# Patient Record
Sex: MALE | Race: WHITE | NOT HISPANIC OR LATINO | URBAN - METROPOLITAN AREA
[De-identification: names, ages, dates, MRNs, and addresses within clinical notes are randomized per-mention and may not be internally consistent; named-entity substitution may affect disease eponyms.]

---

## 2018-12-14 ENCOUNTER — EMERGENCY (EMERGENCY)
Facility: HOSPITAL | Age: 58
LOS: 1 days | Discharge: ANOTHER HOSPITAL | End: 2018-12-14
Attending: EMERGENCY MEDICINE | Admitting: EMERGENCY MEDICINE
Payer: COMMERCIAL

## 2018-12-14 ENCOUNTER — EMERGENCY (EMERGENCY)
Facility: HOSPITAL | Age: 58
LOS: 1 days | Discharge: HOME | End: 2018-12-14
Attending: EMERGENCY MEDICINE | Admitting: EMERGENCY MEDICINE
Payer: COMMERCIAL

## 2018-12-14 VITALS
SYSTOLIC BLOOD PRESSURE: 147 MMHG | TEMPERATURE: 99 F | HEART RATE: 114 BPM | OXYGEN SATURATION: 99 % | DIASTOLIC BLOOD PRESSURE: 90 MMHG | RESPIRATION RATE: 21 BRPM

## 2018-12-14 VITALS
DIASTOLIC BLOOD PRESSURE: 100 MMHG | RESPIRATION RATE: 17 BRPM | OXYGEN SATURATION: 98 % | TEMPERATURE: 98 F | HEART RATE: 133 BPM | SYSTOLIC BLOOD PRESSURE: 160 MMHG

## 2018-12-14 VITALS
SYSTOLIC BLOOD PRESSURE: 142 MMHG | RESPIRATION RATE: 18 BRPM | DIASTOLIC BLOOD PRESSURE: 103 MMHG | WEIGHT: 164.91 LBS | TEMPERATURE: 98 F | HEIGHT: 70 IN | HEART RATE: 125 BPM | OXYGEN SATURATION: 98 %

## 2018-12-14 VITALS
OXYGEN SATURATION: 99 % | DIASTOLIC BLOOD PRESSURE: 110 MMHG | SYSTOLIC BLOOD PRESSURE: 152 MMHG | RESPIRATION RATE: 16 BRPM | HEART RATE: 102 BPM

## 2018-12-14 DIAGNOSIS — R04.0 EPISTAXIS: ICD-10-CM

## 2018-12-14 DIAGNOSIS — Z88.1 ALLERGY STATUS TO OTHER ANTIBIOTIC AGENTS STATUS: ICD-10-CM

## 2018-12-14 DIAGNOSIS — Z79.2 LONG TERM (CURRENT) USE OF ANTIBIOTICS: ICD-10-CM

## 2018-12-14 DIAGNOSIS — I10 ESSENTIAL (PRIMARY) HYPERTENSION: ICD-10-CM

## 2018-12-14 DIAGNOSIS — R45.0 NERVOUSNESS: ICD-10-CM

## 2018-12-14 LAB
ALBUMIN SERPL ELPH-MCNC: 4.3 G/DL — SIGNIFICANT CHANGE UP (ref 3.4–5)
ALP SERPL-CCNC: 52 U/L — SIGNIFICANT CHANGE UP (ref 40–120)
ALT FLD-CCNC: 56 U/L — HIGH (ref 12–42)
ANION GAP SERPL CALC-SCNC: 9 MMOL/L — SIGNIFICANT CHANGE UP (ref 9–16)
APTT BLD: 30.8 SEC — SIGNIFICANT CHANGE UP (ref 27.5–36.3)
AST SERPL-CCNC: 51 U/L — HIGH (ref 15–37)
BASOPHILS NFR BLD AUTO: 0.9 % — SIGNIFICANT CHANGE UP (ref 0–2)
BILIRUB SERPL-MCNC: 0.6 MG/DL — SIGNIFICANT CHANGE UP (ref 0.2–1.2)
BUN SERPL-MCNC: 10 MG/DL — SIGNIFICANT CHANGE UP (ref 7–23)
CALCIUM SERPL-MCNC: 9.4 MG/DL — SIGNIFICANT CHANGE UP (ref 8.5–10.5)
CHLORIDE SERPL-SCNC: 103 MMOL/L — SIGNIFICANT CHANGE UP (ref 96–108)
CO2 SERPL-SCNC: 28 MMOL/L — SIGNIFICANT CHANGE UP (ref 22–31)
CREAT SERPL-MCNC: 0.87 MG/DL — SIGNIFICANT CHANGE UP (ref 0.5–1.3)
EOSINOPHIL NFR BLD AUTO: 0.1 % — SIGNIFICANT CHANGE UP (ref 0–6)
GLUCOSE SERPL-MCNC: 120 MG/DL — HIGH (ref 70–99)
HCT VFR BLD CALC: 41.2 % — SIGNIFICANT CHANGE UP (ref 39–50)
HCT VFR BLD CALC: 45.1 % — SIGNIFICANT CHANGE UP (ref 39–50)
HGB BLD-MCNC: 13.9 G/DL — SIGNIFICANT CHANGE UP (ref 13–17)
HGB BLD-MCNC: 15.4 G/DL — SIGNIFICANT CHANGE UP (ref 13–17)
IMM GRANULOCYTES NFR BLD AUTO: 0.2 % — SIGNIFICANT CHANGE UP (ref 0–1.5)
INR BLD: 0.98 — SIGNIFICANT CHANGE UP (ref 0.88–1.16)
LYMPHOCYTES # BLD AUTO: 11 % — LOW (ref 13–44)
MCHC RBC-ENTMCNC: 33.3 PG — SIGNIFICANT CHANGE UP (ref 27–34)
MCHC RBC-ENTMCNC: 33.3 PG — SIGNIFICANT CHANGE UP (ref 27–34)
MCHC RBC-ENTMCNC: 33.7 G/DL — SIGNIFICANT CHANGE UP (ref 32–36)
MCHC RBC-ENTMCNC: 34.1 G/DL — SIGNIFICANT CHANGE UP (ref 32–36)
MCV RBC AUTO: 97.4 FL — SIGNIFICANT CHANGE UP (ref 80–100)
MCV RBC AUTO: 98.6 FL — SIGNIFICANT CHANGE UP (ref 80–100)
MONOCYTES NFR BLD AUTO: 9.2 % — SIGNIFICANT CHANGE UP (ref 2–14)
NEUTROPHILS NFR BLD AUTO: 78.6 % — HIGH (ref 43–77)
PLATELET # BLD AUTO: 221 K/UL — SIGNIFICANT CHANGE UP (ref 150–400)
PLATELET # BLD AUTO: 266 K/UL — SIGNIFICANT CHANGE UP (ref 150–400)
POTASSIUM SERPL-MCNC: 4.4 MMOL/L — SIGNIFICANT CHANGE UP (ref 3.5–5.3)
POTASSIUM SERPL-SCNC: 4.4 MMOL/L — SIGNIFICANT CHANGE UP (ref 3.5–5.3)
PROT SERPL-MCNC: 8.3 G/DL — HIGH (ref 6.4–8.2)
PROTHROM AB SERPL-ACNC: 10.9 SEC — SIGNIFICANT CHANGE UP (ref 10–12.9)
RBC # BLD: 4.18 M/UL — LOW (ref 4.2–5.8)
RBC # BLD: 4.63 M/UL — SIGNIFICANT CHANGE UP (ref 4.2–5.8)
RBC # FLD: 12.4 % — SIGNIFICANT CHANGE UP (ref 10.3–14.5)
RBC # FLD: 12.4 % — SIGNIFICANT CHANGE UP (ref 10.3–16.9)
SODIUM SERPL-SCNC: 140 MMOL/L — SIGNIFICANT CHANGE UP (ref 132–145)
WBC # BLD: 7 K/UL — SIGNIFICANT CHANGE UP (ref 3.8–10.5)
WBC # BLD: 9.2 K/UL — SIGNIFICANT CHANGE UP (ref 3.8–10.5)
WBC # FLD AUTO: 7 K/UL — SIGNIFICANT CHANGE UP (ref 3.8–10.5)
WBC # FLD AUTO: 9.2 K/UL — SIGNIFICANT CHANGE UP (ref 3.8–10.5)

## 2018-12-14 PROCEDURE — 99284 EMERGENCY DEPT VISIT MOD MDM: CPT | Mod: 25

## 2018-12-14 PROCEDURE — 85027 COMPLETE CBC AUTOMATED: CPT

## 2018-12-14 PROCEDURE — 30903 CONTROL OF NOSEBLEED: CPT | Mod: 50

## 2018-12-14 PROCEDURE — 99285 EMERGENCY DEPT VISIT HI MDM: CPT | Mod: 25

## 2018-12-14 PROCEDURE — 36415 COLL VENOUS BLD VENIPUNCTURE: CPT

## 2018-12-14 PROCEDURE — 93010 ELECTROCARDIOGRAM REPORT: CPT | Mod: 59

## 2018-12-14 PROCEDURE — 93005 ELECTROCARDIOGRAM TRACING: CPT

## 2018-12-14 RX ORDER — SODIUM CHLORIDE 9 MG/ML
1000 INJECTION INTRAMUSCULAR; INTRAVENOUS; SUBCUTANEOUS ONCE
Qty: 0 | Refills: 0 | Status: COMPLETED | OUTPATIENT
Start: 2018-12-14 | End: 2018-12-14

## 2018-12-14 RX ORDER — LIDOCAINE 4 G/100G
1 CREAM TOPICAL ONCE
Qty: 0 | Refills: 0 | Status: COMPLETED | OUTPATIENT
Start: 2018-12-14 | End: 2018-12-14

## 2018-12-14 RX ORDER — OXYMETAZOLINE HYDROCHLORIDE 0.5 MG/ML
1 SPRAY NASAL ONCE
Qty: 0 | Refills: 0 | Status: COMPLETED | OUTPATIENT
Start: 2018-12-14 | End: 2018-12-14

## 2018-12-14 RX ORDER — SODIUM CHLORIDE 0.65 %
2 AEROSOL, SPRAY (ML) NASAL ONCE
Qty: 0 | Refills: 0 | Status: DISCONTINUED | OUTPATIENT
Start: 2018-12-14 | End: 2018-12-18

## 2018-12-14 RX ORDER — OXYMETAZOLINE HYDROCHLORIDE 0.5 MG/ML
2 SPRAY NASAL ONCE
Qty: 0 | Refills: 0 | Status: COMPLETED | OUTPATIENT
Start: 2018-12-14 | End: 2018-12-14

## 2018-12-14 RX ADMIN — Medication 1 APPLICATION(S): at 12:30

## 2018-12-14 RX ADMIN — OXYMETAZOLINE HYDROCHLORIDE 2 SPRAY(S): 0.5 SPRAY NASAL at 19:47

## 2018-12-14 RX ADMIN — SODIUM CHLORIDE 1000 MILLILITER(S): 9 INJECTION INTRAMUSCULAR; INTRAVENOUS; SUBCUTANEOUS at 14:44

## 2018-12-14 RX ADMIN — LIDOCAINE 1 APPLICATION(S): 4 CREAM TOPICAL at 19:47

## 2018-12-14 RX ADMIN — SODIUM CHLORIDE 2000 MILLILITER(S): 9 INJECTION INTRAMUSCULAR; INTRAVENOUS; SUBCUTANEOUS at 19:13

## 2018-12-14 RX ADMIN — Medication 2 MILLIGRAM(S): at 14:44

## 2018-12-14 RX ADMIN — Medication 50 MILLIGRAM(S): at 14:43

## 2018-12-14 RX ADMIN — OXYMETAZOLINE HYDROCHLORIDE 1 SPRAY(S): 0.5 SPRAY NASAL at 12:30

## 2018-12-14 NOTE — ED ADULT NURSE NOTE - OBJECTIVE STATEMENT
pt transferred from [facility]. c.o persistent R nostril bleed since this am. pt reports frequent nose bleeds with exertion x years. denies daily blood thinner. seen 2x at  today. presents to ed with R rhino rocket in place. denies cp, no sob. no dizziness. pt tachycardic upon arrival to ed. ekg in progress. will monitor. awaiting ENT consult.

## 2018-12-14 NOTE — ED PROVIDER NOTE - MEDICAL DECISION MAKING DETAILS
here w/ difficult to control epistaxis. seen by ent in ED - no active bleeding, packed w/ absorbable packing, as per ENT, does NOT require abx. DC home in NAD with strict return precautions given.

## 2018-12-14 NOTE — ED ADULT NURSE NOTE - NSIMPLEMENTINTERV_GEN_ALL_ED
Implemented All Universal Safety Interventions:  [place] to call system. Call bell, personal items and telephone within reach. Instruct patient to call for assistance. Room bathroom lighting operational. Non-slip footwear when patient is off stretcher. Physically safe environment: no spills, clutter or unnecessary equipment. Stretcher in lowest position, wheels locked, appropriate side rails in place.

## 2018-12-14 NOTE — ED PROVIDER NOTE - NSFOLLOWUPINSTRUCTIONS_ED_ALL_ED_FT
Nosebleed - Please follow up with Dr. Nagel next week. Call Monday for an appointment    WHAT YOU NEED TO KNOW:    What do I need to know about a nosebleed? A nosebleed, or epistaxis, occurs when one or more of the blood vessels in your nose break. You may have dark or bright red blood from one or both nostrils. A nosebleed can be caused by any of the following:     Cold, dry air      Trauma from picking your nose or a direct blow to your nose  Abnormal nose structure, such as a deviated septum  Irritation or inflammation from a cold, respiratory infection, or allergies  An object stuck in your nose  Certain medicines, such as blood thinners    How is a nosebleed diagnosed?     A nasal exam may show blood clots or swelling. Your healthcare provider will use an instrument called a speculum to check the inside of your nose. This gently opens your nostrils so your healthcare provider can see what part of your nose is bleeding.     A nasal endoscopy is a deeper exam of the inside of your nose. Your healthcare provider uses a scope (thin, flexible tube with a light and camera on the end) to see further into your nose.     What first aid should I do for a nosebleed?     Sit up and lean forward. This will help prevent you from swallowing blood. Spit blood and saliva into a bowl.     Apply pressure to your nose. Use 2 fingers to pinch your nose shut for 10 to 15 minutes. This will help stop the bleeding.     Apply ice on the bridge of your nose to decrease swelling and bleeding. Use a cold pack or put crushed ice in a plastic bag. Cover it with a towel to protect your skin.    Pack your nose with a cotton ball, tissue, tampon, or gauze bandage to stop the bleeding.    How is a severe nosebleed treated? You may need any of the following if the bleeding does not stop after first aid is done:    Medicines may be applied to a small piece of cotton and placed in your nose. Medicine may also be sprayed in or applied directly to your nose. You may need medicine to prevent an infection. If bleeding is severe, medicine may be injected into a blood vessel in your nose.       Cautery is when a chemical or electric device is used to seal the blood vessels. This may be done to stop bleeding or prevent more bleeding. Local anesthesia may be used.      Nasal packing is when layers of gauze are placed in your nose to provide pressure and stop the bleeding. Local anesthesia may be used. Nasal packing is usually removed in 2 to 3 days. In your case, absorbable packing was used, so it does not need to be removed. Just follow up in ENT clinic - call on Monday for an appointment with Dr. Nagel.      Embolization is a procedure used to stop the bleeding from inside your nose. Medical glue or a small balloon device may be used to clog the blood vessels in your nose.      Surgery may be needed if your nosebleed returns over and over long-term. An artery may be tied to stop bleeding. Damaged tissue or an abnormal structure in your nose may be repaired.     How can I help prevent another nosebleed?     Keep your nose moist. Put a small amount of petroleum jelly inside your nostrils as needed. Use a saline (saltwater) nasal spray. Do not put anything else inside your nose unless your healthcare provider says it is okay. Do not use oil-based lubricants if you use oxygen therapy. They may be flammable.      Use a cool mist humidifier to increase air moisture in your home. This will help your nose stay moist.       Do not pick or blow your nose for at least a week. You can irritate or damage your nose if you pick it. Blowing your nose too hard may cause the bleeding to start again. Do not bend over or strain as this can cause the bleeding to start again.      Avoid irritants such as tobacco smoke or chemical sprays such as .    When should I seek immediate care?     Your nose is still bleeding after 20 minutes, even after you pinch it.   Your nasal packing is soaked with blood.  You have a foul-smelling discharge coming out of your nose.  You feel so weak and dizzy that you have trouble standing up.  You have trouble breathing or talking.     When should I contact my healthcare provider?     You have a fever and are vomiting.  You have pain in and around your nose.  Your nasal pack is loose.  You have questions or concerns about your condition or care.

## 2018-12-14 NOTE — ED ADULT NURSE REASSESSMENT NOTE - NS ED NURSE REASSESS COMMENT FT1
pt came back into the ED due to epistaxis not resolving.
pt on stretcher to [facility] ED for ENT consult. tolerating rhonorockets well. pt in NAD, resting comfortably. denies CP, SOB, HA, dizziness, weakness.

## 2018-12-14 NOTE — ED PROVIDER NOTE - ENMT, MLM
Airway patent, Nasal mucosa clear. Mouth with normal mucosa. Throat has no vesicles, no oropharyngeal exudates and uvula is midline.  dried blood at both nares. no blood in OP. no active bleeding

## 2018-12-14 NOTE — ED PROVIDER NOTE - OBJECTIVE STATEMENT
57yo M sent in from [facility] for ENT evaluation of difficult to control epistaxis. Was packed w/ rhino rocket - pt states no sensation of blood dripping down back of throat since transfer. Tachycardic when transferred. pt has no complaints otherwise, wants to leave asap. denies hx of recurrent epistaxis.

## 2018-12-14 NOTE — ED PROVIDER NOTE - CARE PROVIDER_API CALL
Sesar Reyna), Internal Medicine  [facility] [address] [address]  Phone: [phone]  Fax: [phone] Sesar Reyna), Internal Medicine  [address] [address]  Phone: [phone]  Fax: [phone]    Juan Thompson Jr (MD), Otolaryngology  [address] [place] [address]  Phone: [phone]  Fax: [phone]    Julio Maria), Emergency Medicine  [id number] [place] [address]  Phone: [phone]  Fax: [phone]

## 2018-12-14 NOTE — ED PROVIDER NOTE - CARE PROVIDER_API CALL
Sierra Nagel), Otolaryngology  [address]  [address]  [place], [place] [address]  Phone: [phone]  Fax: [phone]

## 2018-12-14 NOTE — ED ADULT NURSE NOTE - CHPI ED NUR SYMPTOMS NEG
no bleeding gums/no chills/no fever/no nausea/no numbness/no weakness/no loss of consciousness/no vomiting/no syncope

## 2018-12-14 NOTE — ED PROVIDER NOTE - CARE PROVIDERS DIRECT ADDRESSES
,[email or web address],DirectAddress_Unknown

## 2018-12-14 NOTE — ED PROVIDER NOTE - MEDICAL DECISION MAKING DETAILS
epistaxis, now resolved. hr improved to 116. pt states he feels nevous. not orthostatic. no anticoagulants. well apepraing. no systemic sx. feels well and states he wants to go home. no indication for transfusion given no sx. slight htn, instructed to f/u w pmd. no indication for rhinorocket given bleeding resolved epistaxis, restarted after applying manual pressure. hr improved to 116. pt states he feels nevous. placed bilat anterior rhinorockets. tolerated procedure well. states poss allergy to amox. will rx clindamycin. ent f/u. not orthostatic. no anticoagulants. well apepraing. no systemic sx. feels well and states he wants to go home. no indication for transfusion given no sx. slight htn, instructed to f/u w pmd. no indication for rhinorocket given bleeding resolved epistaxis, restarted after applying manual pressure. hr improved to 116. pt states he feels nevous. placed bilat anterior rhinorockets. tolerated procedure well. states poss allergy to amox. will rx clindamycin. ent f/u. not orthostatic. no anticoagulants. well apepraing. no systemic sx. feels well and states he wants to go home. no indication for transfusion given no sx. slight htn, instructed to f/u w pmd. no indication for rhinorocket given bleeding resolved.   explained HR slightly elevated. pt adamant that he his stressed and no cp/sob/light headed. does not want to stay for further obs wants to return to work to work states he feels well and is just stressed

## 2018-12-14 NOTE — ED PROVIDER NOTE - ENMT NEGATIVE STATEMENT, MLM
Ears: no ear pain and no hearing problems.Nose: no nasal congestion and no nasal drainage.Mouth/Throat: no dysphagia, no hoarseness and no throat pain.Neck: no lumps, no pain, no stiffness and no swollen glands. + epistaxis

## 2018-12-14 NOTE — ED PROVIDER NOTE - PHYSICAL EXAMINATION
CONSTITUTIONAL: Well-appearing; well-nourished; in no apparent distress.   HEAD: Normocephalic; atraumatic.   EYES:  conjunctiva and sclera clear  ENT: normal nose; no rhinorrhea; normal pharynx with no erythema or lesions. rhino rocket in R nare, half bloodied. no blood in posterior pharynx seen. L nare looks clear.  NECK: Supple;   CARDIOVASCULAR: Normal S1, S2; no murmurs, rubs, or gallops. tachycardic  RESPIRATORY: Breathing easily; breath sounds clear and equal bilaterally; no wheezes, rhonchi, or rales.  GI: Soft; non-distended; non-tender; no palpable organomegaly.   EXT: No cyanosis or edema; N/V intact  SKIN: Normal for age and race; warm; dry; good turgor; no apparent lesions or rash.   NEURO: A & O x 3; face symmetric; grossly unremarkable.   PSYCHOLOGICAL: The patient’s mood and manner are appropriate.

## 2018-12-14 NOTE — ED ADULT NURSE NOTE - OBJECTIVE STATEMENT
pt presents to ED for epistaxis since 5am. pt denies CP, SOB, dizziness, N/V. pt in NAD, has tongue depressor compression in placed by triage nurse. will continue to monitor.

## 2018-12-14 NOTE — ED ADULT NURSE NOTE - CHIEF COMPLAINT QUOTE
ambulatory, complaining of nosebleed which started at 5am today. States it's been going on for the past three days now. Denies use of blood thinners. Currently denies c/p, dizziness and headache.

## 2018-12-14 NOTE — CONSULT NOTE ADULT - SUBJECTIVE AND OBJECTIVE BOX
ENT Consult Note    58M who is transferred from [facility] for epistaxis. started this AM at 5AM. resolved with pressure. started again and went to [facility]. packed with merocels and bleeding resolved. discharged and returned to ER due to discomfort from merocels. they were pulled out and he started bleeding so rhinorocket was placed and plan for transfer to [facility] for ENT eval. Upon arrival to [facility] no longer bleeding. rhinorocket nearly expelled from patients nares. Has had bleeding previous 4 years ago related to working out. all bleeding    PE:  NAD  anterior rhinoscopy: rhinorocket removed from right nares. no further bleeding. suctioned nares. cleaned out right nares with afrin and 4% lidocaine. no active bleeders or vessels. gel foam with surgicel placed into right nares. left nares without e/o bleeding  OC/OP: no blood on posterior pharyngeal wall.     A/P: 58M w/ epistaxis. resolved. absorbable packing placed  - Nasal saline used TID  - bacitracin to nares at night  - should follow up with Dr. Sierra Nagel or Dr. Arnold De in 1-2 weeks  - when calling should say its a follow up from the ER  - observe for 1 hour then can discharge.

## 2018-12-17 PROBLEM — Z00.00 ENCOUNTER FOR PREVENTIVE HEALTH EXAMINATION: Status: ACTIVE | Noted: 2018-12-17

## 2018-12-19 ENCOUNTER — APPOINTMENT (OUTPATIENT)
Dept: OTOLARYNGOLOGY | Facility: CLINIC | Age: 58
End: 2018-12-19
Payer: COMMERCIAL

## 2018-12-19 VITALS
BODY MASS INDEX: 23.62 KG/M2 | HEIGHT: 70 IN | SYSTOLIC BLOOD PRESSURE: 139 MMHG | WEIGHT: 165 LBS | DIASTOLIC BLOOD PRESSURE: 94 MMHG | TEMPERATURE: 97.5 F | OXYGEN SATURATION: 99 % | HEART RATE: 113 BPM

## 2018-12-19 DIAGNOSIS — Z78.9 OTHER SPECIFIED HEALTH STATUS: ICD-10-CM

## 2018-12-19 DIAGNOSIS — I10 ESSENTIAL (PRIMARY) HYPERTENSION: ICD-10-CM

## 2018-12-19 DIAGNOSIS — Z82.49 FAMILY HISTORY OF ISCHEMIC HEART DISEASE AND OTHER DISEASES OF THE CIRCULATORY SYSTEM: ICD-10-CM

## 2018-12-19 DIAGNOSIS — Z80.9 FAMILY HISTORY OF MALIGNANT NEOPLASM, UNSPECIFIED: ICD-10-CM

## 2018-12-19 PROCEDURE — 99204 OFFICE O/P NEW MOD 45 MIN: CPT | Mod: 25

## 2018-12-19 PROCEDURE — 31238 NSL/SINS NDSC SRG NSL HEMRRG: CPT

## 2018-12-19 RX ORDER — BACITRACIN 500 [IU]/G
500 OINTMENT TOPICAL
Qty: 1 | Refills: 2 | Status: ACTIVE | COMMUNITY
Start: 2018-12-19 | End: 1900-01-01

## 2019-01-08 ENCOUNTER — APPOINTMENT (OUTPATIENT)
Dept: CT IMAGING | Facility: CLINIC | Age: 59
End: 2019-01-08

## 2019-01-08 ENCOUNTER — APPOINTMENT (OUTPATIENT)
Dept: OTOLARYNGOLOGY | Facility: CLINIC | Age: 59
End: 2019-01-08
Payer: COMMERCIAL

## 2019-01-08 VITALS
HEIGHT: 70 IN | TEMPERATURE: 98.3 F | WEIGHT: 165 LBS | DIASTOLIC BLOOD PRESSURE: 101 MMHG | SYSTOLIC BLOOD PRESSURE: 159 MMHG | HEART RATE: 110 BPM | BODY MASS INDEX: 23.62 KG/M2 | OXYGEN SATURATION: 99 %

## 2019-01-08 VITALS — SYSTOLIC BLOOD PRESSURE: 165 MMHG | DIASTOLIC BLOOD PRESSURE: 108 MMHG

## 2019-01-08 DIAGNOSIS — H93.13 TINNITUS, BILATERAL: ICD-10-CM

## 2019-01-08 DIAGNOSIS — R04.0 EPISTAXIS: ICD-10-CM

## 2019-01-08 PROCEDURE — 99214 OFFICE O/P EST MOD 30 MIN: CPT

## 2019-01-11 PROBLEM — R04.0 EPISTAXIS: Status: ACTIVE | Noted: 2018-12-19

## 2019-01-11 PROBLEM — H93.13 TINNITUS OF BOTH EARS: Status: ACTIVE | Noted: 2018-12-19

## 2019-01-11 NOTE — DATA REVIEWED
[de-identified] : audio declined [de-identified] : CT sinus with frothy secretions R max sinus, o/w ess nl
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
## 2019-01-11 NOTE — ASSESSMENT
[FreeTextEntry1] : Reassured the patient & reinforced keeping the nose moist. He is aware of the reasons to RTC, especially further bleeding.

## 2023-09-28 NOTE — ED ADULT TRIAGE NOTE - WEIGHT METHOD
Dear Dr. Delgado, you have received a request to complete a Narrative Report on Luann Dawn.  The request has been scanned to the [employer] Forms Encounter and can be found in your In Basket CC'd Charts Folder.  The paperwork can be found under the media tab in the patient's chart.    Please complete the Narrative Report within 14 days.      Once the Narrative Report is completed, printed on letter head and signed, please forward to Khushboo Duron, Medical Information Department at [facility].    A Pre-Payment of $1,000.00 has been received for the Narrative Report.  Refer to attached scan(s) of Report Request.    Thank you  Khushboo      stated

## 2024-08-16 NOTE — ED ADULT NURSE NOTE - NSFALLRSKPASTHIST_ED_ALL_ED
Physical Therapy Evaluation    Visit Type: Initial Evaluation  Visit: 1  Referring Provider: Eliot Ornelas MD  Medical Diagnosis (from order): C61 - Prostate cancer  (CMD)  N39.46 - Mixed incontinence   Treatment Diagnosis: pelvic health - impaired bladder health, impaired sexual health, impaired motor function/performance/coordination, impaired bowel health, impaired strength, impaired muscle length/flexibility, impaired tissue/wound healing and increased pain/symptoms.  Onset  - Date of surgery: 7/23/2024  - Procedure: prostatectomy  Chart reviewed at time of initial evaluation (relevant co-morbidities, allergies, tests and medications listed):   Past Medical History:  No date: Anxiety  No date: Arthritis  No date: Bronchitis  No date: CAD (coronary artery disease)  No date: Chronic pain      Comment:  Bilateral legs  No date: Claudication (CMD)  10/30/2020: Colon polyp      Comment:  Dr. Boyd Last colonoscopy 10/29/2020 Tubular Sara                repeat in 3 years.  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)      Comment:  Resection  No date: Pneumonia  09/23/2014: S/P ascending aortic aneurysm repair  No date: Sinusitis, chronic  No date: SOB (shortness of breath)  No date: Stomach ulcer      Comment:  x2  No date: Type II diabetes mellitus  (CMD)      Comment:  Does not check blood sugars at home - Daily Metformin                (was always told he was only pre-diabetic)        SUBJECTIVE                                                                                                                Reporting that he hasn't been able to urinate well and only gets a few dribbles everytime that he goes. Currently feels like he \"drank two cases of beer and hasn't gone to the bathroom.\"  Reporting that he has been miserable with leakage, urinary urgency, and frequency.    Is using 2 washcloths and 2 depends a day for urinary leakage.     Reporting that he is having a lot of pelvic pain with sitting -- feels pain at perineum    Reporting high back pain -- really bothersome. Is wondering if he can ride his bike (no resistance) as this helps with his COPD symptoms and mental health.    Occupation: owns own buisness        Pain / Symptoms  - Pain/symptom is: constant  - Location: perineum  - Quality / Description: ache, sharp        Bladder: difficulty emptying bladder, frequency, leakage on way to restroom, leakage with activities, difficulty with voiding, just in case toileting, post-void dribbling, straining to empty and sense of incomplete emptying  - Alleviating Factors: unable to state anything that helps reduce the pain  - Progression since onset: worsening    Function:   Limitations / Exacerbation Factors:   - Patient reports pain with function reported below.  - , sitting, wearing clothing, limited community/social interactions, coughing/laughing/sneezing, limited ability to be away from restroom, need to wear a pad, disruption in ADLs/IADLs due to restroom use and mental health impact  Prior Level of Function: no limitation in involved area,    Patient Goals: decrease symptoms and decreased pain.    Prior treatment  - outpatient PT  - Discharged from hospital, home health, or skilled nursing facility in last 30 days: yes  Home Environment   - Assistance available: as needed  - Denies 2 or more falls or an unexplained fall with injury in the last year.  - Feel safe at home / work / school: yes      OBJECTIVE                                                                                                                                 Pelvic Health  Bladder Bowel Information  Bladder:   - Frequency: \"all the time\" went before leaving and now has to go again  --reporting that he was sitting yesterday on recliner, with cough had large amount of leakage, \"felt like a fire hose\"   - Urgency: reporting that he will get leakage with urgency   - Leakage: stress activities (coughing/sneezing)  --medium to large amount   --coughs a lot due to COPD   - Nocturia: 1x/night, leakage at night is getting much better   - Toileting: with urination, noting a pressure and almost burning feeling     Bowel:  The last few nights, has had blood in stool. Thinks this may be starting to take his blood thinner again. Will follow up with cardiologist  Isn't taking stool softener anymore    Outcome/Assessments  Patient Specific Functional Scale:   Activity: No pain with sitting at perineum, Score: 0  Activity: >60% improvement in urinary incontinence with stress activities , Score: 0  Average Score: 0  Each activity is scored: 0=unable to perform activity to 10=able to perform activity at the same level as before injury or problem      Treatment     Therapeutic Exercise  Patient educated on anatomy and physiology as it relates to medical diagnosis and findings from physical therapy evaluation.    Visual aids utilized for education.   Educated on impact of dysfunction on pelvic floor, bowel and bladder function and sexual function.  Patient educated role of fluid intake, bladder irritants and bladder stimulants on bladder control and function.   Advised to increase water intake and discussed bladder irritants  Provided skilled education on improving   Pressure management  Seated posture  Exhalation with movement  Can reach out to Dr. Ornelas about riding bike  If unable to urinate, go to ED as you don't want urinary retention (risk of infection)  Bladder starting and emptying techniques    Reviewed and  performed home exercise program -- see below       Skilled input: verbal instruction/cues, tactile instruction/cues and demonstration    Writer verbally educated and received verbal consent for hand placement, positioning of patient, and techniques to be performed today from patient for therapist position for techniques as described above and how they are pertinent to the patient's plan of care.  Home Exercise Program  Access Code: [email or web address]  URL: [email or web address]  Date: 08/20/2024  Prepared by: Maria Isabel Scott    Program Notes  to start urination: contract pelvic floorat end of urination: contract pelvic floor x 5 at the end or can apply gentle pressure at base of penis and gently pull to end of penis to help with emptying    Exercises  - Seated Pelvic Floor Contraction  - 1 x daily - 7 x weekly - 1 sets - 15 reps - 5-10 seconds hold  - Quick Flick Pelvic Floor Contractions Seated     - 1 x daily - 7 x weekly - 1 sets - 30 reps - 1 seconds hold  - Pelvic Floor Lengthening in Hooklying  - 1 x daily - 7 x weekly - 1 sets - 10 reps      ASSESSMENT                                                                                                          73 year old patient has reported functional limitations listed above impacted by signs and symptoms consistent with treatment diagnosis below.  Treatment Diagnosis:   - Involved: pelvic health.  - Symptoms/impairments: impaired bladder health, impaired sexual health, impaired motor function/performance/coordination, impaired bowel health, impaired strength, impaired muscle length/flexibility, impaired tissue/wound healing and increased pain/symptoms.    Prognosis: Patient will benefit from skilled therapy.  Rehabilitative potential is: good.  Predicted patient presentation: Moderate (evolving) - Patient comorbidities and complexities, as defined above, may have varying impact on steady progress for prescribed plan of care.  Education:   -  Present and ready to learn: patient  - Results of above outlined education: Verbalizes understanding and Demonstrates understanding    PLAN                                                                                                                         The following skilled interventions to be implemented to achieve goals listed below:  Activities of Daily Living/Self Care (68777)  Neuromuscular Re-Education (19705)  Therapeutic Exercise (55117)  Dry Needling  Manual Therapy (45819)  Therapeutic Activity (05505)  Electrical Stimulation Unattended (49940 or )    Frequency / Duration  1 times per week tapering as patient progresses for 12 weeks for an estimated total of 8 visits    Patient involved in and agreed to plan of care and goals.  Attendance policy reviewed with patient.    Suggestions for next session as indicated: Progress per plan of care    Goals  Long Term Goals: to be met by end of plan of care  1. Patient will control urinary urge for 5-10 minutes to successfully walk to bathroom in control, without leakage.  2. Patient will cough/sneeze/laugh >60% of the time without urinary incontinence.   3. Patient Specific Functional Scale (PSFS) will improve an average score 6/10 (minimal detectable change (90%CI) for average score is 2 points, for single activity score is 3 points)      Therapy procedure time and total treatment time can be found documented on the Time Entry flowsheet     no